# Patient Record
(demographics unavailable — no encounter records)

---

## 2025-04-16 NOTE — HISTORY OF PRESENT ILLNESS
[FreeTextEntry1] : cpe [de-identified] : abigail 2023 tdap utd  smoked marijuana directly prior to the visit

## 2025-04-16 NOTE — HEALTH RISK ASSESSMENT
[Yes] : Yes [Monthly or less (1 pt)] : Monthly or less (1 point) [1 or 2 (0 pts)] : 1 or 2 (0 points) [Never (0 pts)] : Never (0 points) [0] : 2) Feeling down, depressed, or hopeless: Not at all (0) [PHQ-2 Negative - No further assessment needed] : PHQ-2 Negative - No further assessment needed [Employed] : employed [Sexually Active] : sexually active [Former] : Former [0-4] : 0-4 [Audit-CScore] : 1 [de-identified] : plays soccer every friday just joined a league, treadmill at the gym [de-identified] : not super healthy, but no junk food; not watching calories, no soda, fried food occasionally [HFX1Cymil] : 0 [High Risk Behavior] : no high risk behavior [FreeTextEntry2] :  [de-identified] : pack every two weeks for 5 years; daily intake of marijuana smoking

## 2025-07-08 NOTE — PHYSICAL EXAM
[Normal] : no acute distress, well nourished, well developed and well-appearing [Coordination Grossly Intact] : coordination grossly intact [No Focal Deficits] : no focal deficits [Normal Gait] : normal gait [Normal Affect] : the affect was normal [Normal Insight/Judgement] : insight and judgment were intact [FreeTextEntry1] : obvious external hemorrhoid is noted without thrombosis

## 2025-07-08 NOTE — HISTORY OF PRESENT ILLNESS
[FreeTextEntry1] : hemorrhoids [de-identified] : notes itching and discomfort of his rectum when he is defecating denies blood in the stool or any other symptoms